# Patient Record
Sex: MALE | Race: WHITE | URBAN - METROPOLITAN AREA
[De-identification: names, ages, dates, MRNs, and addresses within clinical notes are randomized per-mention and may not be internally consistent; named-entity substitution may affect disease eponyms.]

---

## 2017-06-29 ENCOUNTER — OFFICE VISIT (OUTPATIENT)
Dept: OTOLARYNGOLOGY | Facility: CLINIC | Age: 42
End: 2017-06-29

## 2017-06-29 VITALS — BODY MASS INDEX: 31.72 KG/M2 | WEIGHT: 168 LBS | HEIGHT: 61 IN

## 2017-06-29 DIAGNOSIS — R49.0 DYSPHONIA: Primary | ICD-10-CM

## 2017-06-29 DIAGNOSIS — J38.4 PRE-NODULAR EDEMA OF THE VOCAL FOLDS: ICD-10-CM

## 2017-06-29 ASSESSMENT — PAIN SCALES - GENERAL: PAINLEVEL: NO PAIN (0)

## 2017-06-29 NOTE — LETTER
"6/29/2017      RE: Sagar Harman  [address]  [facility] [address]       Dear Colleague:    I had the pleasure of meeting Sagar Harman in consultation at the [facility] of the [facility] on a self-referred basis, for evaluation of dysphonia. The note from our visit follows.  I appreciate the opportunity to participate in the care of this pleasant patient.    Please feel free to contact me with any questions.    Sincerely yours,    Yamilex Jacobsen M.D., M.P.H.  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 Symptoms include increased effort to talk/sing, poor voice quality, voice tires easily, voice breaks, change in vocal pitch, change in vocal volume, change in range, shaky/unsteady voice and raspy voice.    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 Over the past week he has been experiencing vocal fatigue in a way that feels unusual. His speaking range is more affected; breaking into falsetto easily, with very little mixed range. By end of the show, his low/mid range is barely present. Middle-range projection is the most fatiguing. His speaking voice often sounds normal to him, but feels like it is resonating slightly differently than normally. He has adjusted what he is doing vocally, which seems to be helping; he is projecting slightly less than he had been. His singing voice has been less affected and he reports that he can sing \"over or through things.\" He recently completed a busy series of rehearsals and run throughs, which compounded the difficulty. He notes that the performance schedule is likely to have a slightly lower vocal load than the past 2-3 weeks.    He " has had some difficulties with vocal fatigue in the past, but has never needed to miss a show and has not previously seen an ENT or speech therapist.    Swallowing  No concerns.     Cough/Throat-clearing  No concerns.     Breathing  No concerns.     Throat discomfort  No concerns.     Reflux-type symptoms  He never experiences heartburn/indigestion. He is not taking reflux medications.      MEDICATIONS:     No current outpatient prescriptions on file.       ALLERGIES:  No Known Allergies    PAST MEDICAL HISTORY: No past medical history on file.     PAST SURGICAL HISTORY: No past surgical history on file.    HABITS/SOCIAL HISTORY:    Social History   Substance Use Topics     Smoking status: Former Smoker     Years: 4.00     Smokeless tobacco: Never Used      Comment: patient smoked socially in his 20's     Alcohol use Not on file       FAMILY HISTORY:  No family history on file.    REVIEW OF SYSTEMS:  The patient completed a comprehensive 11 point review of systems (below), which was reviewed. Positives are as noted below; pertinent findings are as noted in the HPI.     Patient Supplied Answers to Review of Systems   ENT ROS 6/29/2017   Ears, Nose, Throat Hoarseness       PHYSICAL EXAMINATION:  General: The patient was alert and conversant, and in no acute distress.    Eyes: PERRL, conjunctiva and lids normal, sclera nonicteric.  Nose: Anterior rhinoscopy: no gross abnormalities. no  bleeding; no  mucopurulence; septum grossly normal, mild mucoid drainage and/or crusting.  Oral cavity/oropharynx: No masses or lesions. Dentition in good condition. Floor of mouth and oral tongue soft to palpation. Tongue mobility and palate elevation intact and symmetric.  Ears: Normal auricles, external auditory canals bilaterally. Visualized portions of tympanic membranes normal bilaterally. Moderate cerumen bilaterally.  Neck: No palpable cervical lymphadenopathy. There was mild to moderate tenderness to palpation of the  thyrohyoid space, which was very narrow. No obvious thyroid abnormality. Landmarks palpable.  Resp: Breathing comfortably, no stridor or stertor.  Neuro: Symmetric facial function. Other cranial nerves as documented above.  Psych: Normal affect, pleasant and cooperative.  Voice/speech: Mild dysphonia characterized by breathiness, roughness, strain and glottal cohen. More pronounced in upper range.  Extremities: No cyanosis, clubbing, or edema of the upper extremities.    Intake scores  Total Score for Last Patient-Answered VHI Questionnaire  VHI Total Score 6/29/2017   VHI Total Score 9     Total Score for Last Patient-Answered EAT Questionnaire  EAT Total Score 6/29/2017   EAT Total Score 0     Total Score for Last Patient-Answered CSI Questionnaire  CSI Total Score 6/29/2017   CSI Total Score 0       PROCEDURE:   Flexible fiberoptic laryngoscopy and laryngovideostroboscopy  Indications: This procedure was warranted to evaluate the patient's laryngeal function. Risks, benefits, and alternatives were discussed.  Description: After written informed consent was obtained, a time-out was performed to confirm patient identity, procedure, and procedure site. Topical 3% lidocaine with 0.25% phenylephrine was applied to the nasal cavities. I performed the endoscopy and no complications were apparent. Continuous and stroboscopic light were utilized to assess routine phonation and variable frequency phonation.  Performed by: Yamilex Jacobsen MD MPH  SLP: Mabel De Jesus, PhD, CCC-SLP  Findings: Normal nasopharynx. Normal base of tongue, valleculae, and epiglottis. Vocal fold mobility: right: normal; left: normal. Medial edges of the vocal folds: smooth and straight with subtle prominence of the mid vocal folds bilaterally. No focal mucosal lesions were observed on the true vocal folds. Glissade produced appropriate elongation. There was moderate supraglottic recruitment with connected speech, dramatically improved on  singing. Mucosa of false vocal folds, aryepiglottic folds, piriform sinuses, and posterior glottis unremarkable. Airway was patent.   No lesions on NBI.    The addition of stroboscopy allowed evaluation of the mucosal wave.   Amplitude: right: normal; left: normal. Symmetry: good symmetry. Stroboscopy demonstrated the presence of soft translucent mild bilateral vocal fold swellings. Closure pattern: at modal pitch, simultaneously slightly spindle-shaped and hourglass-shaped. Closure plane: at glottic level. Phase distribution: normal.              IMPRESSION AND PLAN:   Sagar Harman presents with muscle tension dysphonia and subtle bilateral mid vocal fold swellings. He was pleased to see that there were no focal lesions that would require voice rest or surgical intervention. His laryngeal configuration during singing is more efficient than in speaking. I therefore recommended speech therapy as initial primary management, with goals including improving laryngeal efficiency and improving phonatory mechanics. He will return as needed. He will be back in [place] after the run of this show is complete, and if he needs assistance establishing voice care, we will be happy to help. I appreciate the opportunity to participate in the care of this very pleasant patient.             Yamilex Jacobsen MD

## 2017-06-29 NOTE — MR AVS SNAPSHOT
After Visit Summary   2017    Sagar Harman    MRN: [id number]           Patient Information     Date Of Birth          1975        Visit Information        Provider Department      2017 11:45 AM Mabel De Jesus, SLP [facility]        Today's Diagnoses     Dysphonia    -  1       Follow-ups after your visit        Who to contact     Please call your clinic at [phone] to:    Ask questions about your health    Make or cancel appointments    Discuss your medicines    Learn about your test results    Speak to your doctor   If you have compliments or concerns about an experience at your clinic, or if you wish to file a complaint, please contact [facility] Patient Relations at [phone] or email us at [email or web address]         Additional Information About Your Visit        MyChart Information     Thrill is an electronic gateway that provides easy, online access to your medical records. With Thrill, you can request a clinic appointment, read your test results, renew a prescription or communicate with your care team.     To sign up for TitanFilet visit the website at [email or web address]   You will be asked to enter the access code listed below, as well as some personal information. Please follow the directions to create your username and password.     Your access code is: [email or web address]  Expires: 2017 11:35 AM     Your access code will  in 90 days. If you need help or a new code, please contact your [facility] or call [phone] for assistance.        Care EveryWhere ID     This is your Care EveryWhere ID. This could be used by other organizations to access your [facility] medical records  [id number]         Blood Pressure from Last 3 Encounters:   No data found for BP    Weight from Last 3 Encounters:   17 76.2 kg (168 lb)              We Performed the Following     C BEHAVIORAL &  QUALITATIVE ANALYSIS VOICE AND RESONANCE        Primary Care Provider    Doctor Unknown, MD       No address on file        Equal Access to Services     [facility] : Hadii ovi Croft, catie klein, caitlin laughlin. So [facility] [phone].    ATENCIÓN: Si habla español, tiene a perez disposición servicios gratuitos de asistencia lingüística. Llame al [phone].    We comply with applicable federal civil rights laws and [place] laws. We do not discriminate on the basis of race, color, national origin, age, disability sex, sexual orientation or gender identity.            Thank you!     Thank you for choosing [facility]  for your care. Our goal is always to provide you with excellent care. Hearing back from our patients is one way we can continue to improve our services. Please take a few minutes to complete the written survey that you may receive in the mail after your visit with us. Thank you!             Your Updated Medication List - Protect others around you: Learn how to safely use, store and throw away your medicines at www.disposemymeds.org.      Notice  As of 6/29/2017 11:59 PM    You have not been prescribed any medications.

## 2017-06-29 NOTE — PROGRESS NOTES
"Dear Colleague:    I had the pleasure of meeting Sagar Harman in consultation at the [facility] of the [facility] on a self-referred basis, for evaluation of dysphonia. The note from our visit follows.  I appreciate the opportunity to participate in the care of this pleasant patient.    Please feel free to contact me with any questions.    Sincerely yours,    Yamilex Jacobsen M.D., M.P.H.  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 Symptoms include increased effort to talk/sing, poor voice quality, voice tires easily, voice breaks, change in vocal pitch, change in vocal volume, change in range, shaky/unsteady voice and raspy voice.    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 Over the past week he has been experiencing vocal fatigue in a way that feels unusual. His speaking range is more affected; breaking into falsetto easily, with very little mixed range. By end of the show, his low/mid range is barely present. Middle-range projection is the most fatiguing. His speaking voice often sounds normal to him, but feels like it is resonating slightly differently than normally. He has adjusted what he is doing vocally, which seems to be helping; he is projecting slightly less than he had been. His singing voice has been less affected and he reports that he can sing \"over or through things.\" He recently completed a busy series of rehearsals and run throughs, which compounded the difficulty. He notes that the performance schedule is likely to have a slightly lower vocal load than the past 2-3 weeks.    He has had some difficulties with vocal fatigue in the past, but has never " needed to miss a show and has not previously seen an ENT or speech therapist.    Swallowing  No concerns.     Cough/Throat-clearing  No concerns.     Breathing  No concerns.     Throat discomfort  No concerns.     Reflux-type symptoms  He never experiences heartburn/indigestion. He is not taking reflux medications.      MEDICATIONS:     No current outpatient prescriptions on file.       ALLERGIES:  No Known Allergies    PAST MEDICAL HISTORY: No past medical history on file.     PAST SURGICAL HISTORY: No past surgical history on file.    HABITS/SOCIAL HISTORY:    Social History   Substance Use Topics     Smoking status: Former Smoker     Years: 4.00     Smokeless tobacco: Never Used      Comment: patient smoked socially in his 20's     Alcohol use Not on file       FAMILY HISTORY:  No family history on file.    REVIEW OF SYSTEMS:  The patient completed a comprehensive 11 point review of systems (below), which was reviewed. Positives are as noted below; pertinent findings are as noted in the HPI.     Patient Supplied Answers to Review of Systems   ENT ROS 6/29/2017   Ears, Nose, Throat Hoarseness       PHYSICAL EXAMINATION:  General: The patient was alert and conversant, and in no acute distress.    Eyes: PERRL, conjunctiva and lids normal, sclera nonicteric.  Nose: Anterior rhinoscopy: no gross abnormalities. no  bleeding; no  mucopurulence; septum grossly normal, mild mucoid drainage and/or crusting.  Oral cavity/oropharynx: No masses or lesions. Dentition in good condition. Floor of mouth and oral tongue soft to palpation. Tongue mobility and palate elevation intact and symmetric.  Ears: Normal auricles, external auditory canals bilaterally. Visualized portions of tympanic membranes normal bilaterally. Moderate cerumen bilaterally.  Neck: No palpable cervical lymphadenopathy. There was mild to moderate tenderness to palpation of the thyrohyoid space, which was very narrow. No obvious thyroid abnormality.  Landmarks palpable.  Resp: Breathing comfortably, no stridor or stertor.  Neuro: Symmetric facial function. Other cranial nerves as documented above.  Psych: Normal affect, pleasant and cooperative.  Voice/speech: Mild dysphonia characterized by breathiness, roughness, strain and glottal cohen. More pronounced in upper range.  Extremities: No cyanosis, clubbing, or edema of the upper extremities.    Intake scores  Total Score for Last Patient-Answered VHI Questionnaire  VHI Total Score 6/29/2017   VHI Total Score 9     Total Score for Last Patient-Answered EAT Questionnaire  EAT Total Score 6/29/2017   EAT Total Score 0     Total Score for Last Patient-Answered CSI Questionnaire  CSI Total Score 6/29/2017   CSI Total Score 0       PROCEDURE:   Flexible fiberoptic laryngoscopy and laryngovideostroboscopy  Indications: This procedure was warranted to evaluate the patient's laryngeal function. Risks, benefits, and alternatives were discussed.  Description: After written informed consent was obtained, a time-out was performed to confirm patient identity, procedure, and procedure site. Topical 3% lidocaine with 0.25% phenylephrine was applied to the nasal cavities. I performed the endoscopy and no complications were apparent. Continuous and stroboscopic light were utilized to assess routine phonation and variable frequency phonation.  Performed by: Yamilex Jacobsen MD MPH  SLP: Mabel De Jesus, PhD, CCC-SLP  Findings: Normal nasopharynx. Normal base of tongue, valleculae, and epiglottis. Vocal fold mobility: right: normal; left: normal. Medial edges of the vocal folds: smooth and straight with subtle prominence of the mid vocal folds bilaterally. No focal mucosal lesions were observed on the true vocal folds. Glissade produced appropriate elongation. There was moderate supraglottic recruitment with connected speech, dramatically improved on singing. Mucosa of false vocal folds, aryepiglottic folds, piriform sinuses,  and posterior glottis unremarkable. Airway was patent.   No lesions on NBI.    The addition of stroboscopy allowed evaluation of the mucosal wave.   Amplitude: right: normal; left: normal. Symmetry: good symmetry. Stroboscopy demonstrated the presence of soft translucent mild bilateral vocal fold swellings. Closure pattern: at modal pitch, simultaneously slightly spindle-shaped and hourglass-shaped. Closure plane: at glottic level. Phase distribution: normal.              IMPRESSION AND PLAN:   Sagar Harman presents with muscle tension dysphonia and subtle bilateral mid vocal fold swellings. He was pleased to see that there were no focal lesions that would require voice rest or surgical intervention. His laryngeal configuration during singing is more efficient than in speaking. I therefore recommended speech therapy as initial primary management, with goals including improving laryngeal efficiency and improving phonatory mechanics. He will return as needed. He will be back in [place] after the run of this show is complete, and if he needs assistance establishing voice care, we will be happy to help. I appreciate the opportunity to participate in the care of this very pleasant patient.

## 2017-06-29 NOTE — MR AVS SNAPSHOT
"              After Visit Summary   2017    Sagar Harman    MRN: [id number]           Patient Information     Date Of Birth          1975        Visit Information        Provider Department      2017 11:45 AM Yamilex Jacobsen MD [facility] N[facility] Th[facility]        Today's Diagnoses     Dysphonia    -  1    Pre-nodular edema of the vocal folds           Follow-ups after your visit        Who to contact     Please call your clinic at [phone] to:    Ask questions about your health    Make or cancel appointments    Discuss your medicines    Learn about your test results    Speak to your doctor   If you have compliments or concerns about an experience at your clinic, or if you wish to file a complaint, please contact [facility] Patient Relations at [phone] or email us at [email or web address]         Additional Information About Your Visit        MyChart Information     eVoter is an electronic gateway that provides easy, online access to your medical records. With eVoter, you can request a clinic appointment, read your test results, renew a prescription or communicate with your care team.     To sign up for Newsyt visit the website at [email or web address]   You will be asked to enter the access code listed below, as well as some personal information. Please follow the directions to create your username and password.     Your access code is: [email or web address]  Expires: 2017 11:35 AM     Your access code will  in 90 days. If you need help or a new code, please contact your [facility] or call [phone] for assistance.        Care EveryWhere ID     This is your Care EveryWhere ID. This could be used by other organizations to access your [facility] medical records  [id number]        Your Vitals Were     Height BMI (Body Mass Index)                1.56 m (5' 1.42\") 31.31 kg/m2           Blood Pressure from " Last 3 Encounters:   No data found for BP    Weight from Last 3 Encounters:   06/29/17 76.2 kg (168 lb)              We Performed the Following     IMAGESTREAM RECORDING ORDER     LARYNGOSCOPY FLEX/RIGID W STROBOSCOPY        Primary Care Provider    Doctor Unknown, MD       No address on file        Equal Access to Services     [facility] : Hadii aad stephanie bretto Sofosterali, waaxda luqadaha, qaybta kaalmada adeegyada, waxshan checoin hayherbertn kandi ascencionfamilia vogel . So [facility] [phone].    ATENCIÓN: Si habla español, tiene a perez disposición servicios gratuitos de asistencia lingüística. Llame al [phone].    We comply with applicable federal civil rights laws and [place] laws. We do not discriminate on the basis of race, color, national origin, age, disability sex, sexual orientation or gender identity.            Thank you!     Thank you for choosing [facility]  for your care. Our goal is always to provide you with excellent care. Hearing back from our patients is one way we can continue to improve our services. Please take a few minutes to complete the written survey that you may receive in the mail after your visit with us. Thank you!             Your Updated Medication List - Protect others around you: Learn how to safely use, store and throw away your medicines at www.disposemymeds.org.      Notice  As of 6/29/2017  1:44 PM    You have not been prescribed any medications.

## 2017-06-29 NOTE — LETTER
"6/29/2017       RE: Sagar Harman  [address]  [facility] [address]     Dear Colleague,    Thank you for referring your patient, Sagar Harman, to the [facility] at [facility]. Please see a copy of my visit note below.    [facility]  Matthew Winston Jr., M.D., F.A.C.S.  Yamilex Jacobsen M.D., M.P.H.  Mabel De Jesus, Ph.D., CCC/SLP  Azra Lam M.M. (voice), M.SUZE., CCC/SLP  Jamie Luna M.M. (voice), M.A., CCC/SLP    [facility]  VOICE/SPEECH/BREATHING EVALUATION AND LARYNGEAL EXAMINATION REPORT    Patient: Sagar Harman  Date of Visit: 6/29/2017    HISTORY  Sagar Harman was seen for initial voice evaluation, laryngeal examination and treatment in conjunction with a visit to Dr. Jacobsen.  Please refer to Dr. Jacobsen s dictation for a more complete history and impressions. 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    Currently starring in a musical at [employer]    \"voice is fatigued in a way it has never been before\"    Speaking range and middle projected range are most problematic    Using microphone in the show, but director has asked for \"projected voice\" which has lead to vocal overload    In speech, voice in modal register suddenly breaks into falsetto; worsens quickly such that the low-mid range is \"barely there\"    Doesn't recover fully by the next day    Recently improving because he made adjustments to how he was producing voice in speaking; still concerned for laryngeal health and safety to perform     No history of voice problems despite very heavy vocal demand as gaona-actor    DIAGNOSIS/REASON FOR REFERRAL  Dysphonia/ Evaluate, perform laryngeal exam, treat as appropriate    [facility] Supp[facility] Answers To [facility]  [facility] Intake - General Form Review 6/29/2017   Are you having any of these symptoms? Increased effort to " talk/sing, Poor voice quality   Are you having any of these symptoms? Voice tires easily, Voice breaks, Change in vocal pitch, Change in vocal volume, Change in range, Shaky/unsteady voice, Raspy voice   Do you use caffeine? Yes   If you do use caffeine, how many oz. do you typically drink in a day? 2 cups   How many oz. of non-caffeinated fluid do you typically drink in a day? a lot   How often do you experience heartburn, indigestion, chest pain, stomach acid coming up, and/or tasting acid in your mouth or throat? Never   Have reflux medications been recommended to you? No   If yes, are you taking them regularly? N/A   Voice: Yes   Swallowing: No   Cough and/or throat-clearing: No   Breathing problem: No   Throat discomfort and/or the feeling of a lump in your throat: No   A different problem, not listed above: No   Other physicians/health care providers who should receive a copy of today's note (please provide first and last name(s), city): [place]       Patient Supplied Answers To [facility] Intake Voice Questionnaire  [facility] Intake - Voice Review 6/29/2017   How long have you had this voice problem? 1 week   In regards to your voice problem, was there anything unusual about the time the problem was first noticed (such as illness, accident, surgery, etc.)?  If yes, please describe.  You have 200 characters to respond.  We will ask for more detail at your visit. tons of singing speaking   What do you think caused this voice problem? over use   How quickly did the voice problem develop? Gradually   For your voice problem, how do the symptoms vary? Worse after heavy voice use   Over time, how has the voice problem changed? Same   How would you rate your typical vocal demand? Extraordinary: Very high voice demands; your work or personal success depends heavily on your voice quality   Please list activities that involve your voice (such as singing, coaching, etc.): singing projected speech   Effort for  "speaking 5   Effort for singing 5   Overall vocal quality 4   Is your voice ever normal, even briefly? Yes   What health care professionals have you seen for this voice problem?  Who and when? none   For your voice problem, what testing/studies have you done (such as imaging/reflux testing)? none   Did you receive any therapy or treatment for your voice problem?  Please describe briefly. none   Prior to this episode, have you ever had a voice problem before?  If yes, at that time did you have therapy or treatment for the voice problem?  Please provide a brief description of that treatment. nothing beyond fatigue   For your voice problem, is there anything else you'd like to tell us? no       Patient Supplied Answers To VHI Questionnaire  Voice Handicap Index (VHI-10) 6/29/2017   How often do you have any of the following symptoms:  Indigestion, heartburn, chest pain, stomach acid coming up, and/or tasting acid in your mouth or throat? Never   (F1) My voice makes it difficult for people to hear me. Almost never   (F2) People have difficulty understanding me in a noisy room. Sometimes   (F8) My voice difficulties restrict my personal and social life. Never   (F9) I feel left out of conversations because of my voice. Never   (F10) My voice problem causes me to lose income. Never   (P5) I feel as though I have to strain to produce voice. Almost never   (P6) The clarity of my voice is unpredictable. Sometimes   (E4) My voice problem upsets me. Sometimes   (E6) My voice makes me feel handicapped. Almost never   (P3) People ask, \"What's wrong with your voice?\" Never   VHI Total Score 9     Patient Supplied Answers To EAT Questionnaire  Eating Assessment Tool (EAT-10) 6/29/2017   My swallowing problem has caused me to lose weight. 0   My swallowing problem interferes with my ability to go out for meals. 0   Swallowing solids takes extra effort. 0   Swallowing pills takes extra effort. 0   Swallowing is painful. 0   The " "pleasure of eating is affected by my swallowing. 0   When I swallow food sticks in my throat. 0   I cough when I eat. 0   Swallowing is stressful. 0   How often do things go down the wrong way when you swallow? Never   Have you had pneumonia, bronchitis, or another severe lung infection in the last 6 months? No   EAT Total Score 0     Patient Supplied Answers To CSI Questionnaire  Cough Severity Index (CSI) 6/29/2017   My cough is worse when I lie down. Never   My coughing problem causes me to restrict my personal and social life. Never   I tend to avoid places because of my cough problem. Never   I feel embarrassed because of my coughing problem. Never   People ask, \"What's wrong?\" because I cough a lot. Never   I run out of air when I cough. Never   My coughing problem affects my voice. Never   My coughing problem limits my physical activity. Never   My coughing problem upsets me. Never   People ask me if I am sick because I cough a lot. Never   CSI Total Score 0         CURRENT PATIENT COMPLAINTS    Primary: voice    Secondary: none    Denies significant dyspnea, dysphagia, or pain    OTHER PERTINENT HISTORY    Unremarkable; Healthy, no medications    OBJECTIVE FINDINGS  VOICE/ SPEECH/ NON-COMMUNICATIVE LARYNGEAL BEHAVIORS EVALUATION  An evaluation of the voice was accomplished today; salient features are as follows:    Palpation of the laryngeal area shows:    Tenderness of the thyrohyoid area     Reduced thyrohyoid space     Additional closure of the thyrohyoid space on phonation    Breathing pattern:    Appears within normal limits and adequate; good technique    Tension is evident:    Neck - marked    Voice quality is characterized by    Excellent quality; resonant and clear    Mild to moderate backward focus and tongue-base tension when demonstrating lines from the show     Mild glottal cohen at ends of phrases; WNL    Habitual pitch was not formally tested, but is judged to be WNL and appropriate    Loudness " "is WNL and appropriate for the setting    Sustained vowels:     Comfortable pitch /a/: very mild roughness    High pitch /a/: less rough    A singing task shows voice quality is consistent between speech and singing, with overall excellent quality and excellent technique    In a pitch glide task to determine pitch range, he demonstrates a mild register break that he says is not unexpected recently; otherwise supranormal pitch range    he states today is a typical voice day for his voice recently; it is lower and \"chestier\" than his normal voice, although the technique is not different    GLOBAL ASSESSMENT OF DYSPHONIA: 48 /100    CAPE-V Overall Severity:   4/100    LARYNGEAL EXAMINATION    Endoscopic laryngeal examination was performed by:  Yamilex Jacobsen MD,   I provided technical support, and provided the protocol of instructions for the patient.  Type of exam:   Flexible endoscopy with chip-tip technology and stroboscopy  The laryngeal and pharyngeal structures were evaluated for gross appearance, mobility, function, and focal lesions / abnormalities of the associated mucosa.  Stroboscopy was warranted to evaluate closure, symmetry, and vibratory characteristics of the vocal folds.  All findings were within normal limits with the exception of the following salient features:     Mild collection of thickened secretions along the entire length of the vocal folds bilaterally    Very subtle swellings at the anterior one-thirds of the vibratory margins bilaterally    Slight spindle-shaped glottic gap apparent at midrange pitches    Mild anterior-posterior constriction during speech    Mild increased adduction at the posterior glottis    Excellent glottic and supraglottic technique in singing    Stroboscopy shows the swellings do not appear to interrupt the mucosal wave    Dr. Jacobsen and I reviewed this laryngeal exam with Mr. Harman today, and I provided pertinent explanations, as well as written " information:    Endoscopic findings are consistent with audio-perceptual assessment and patient Hx/complaints.    his symptoms are accounted for by the mild but contradictory findings of a slight spindle-shaped gap on phonatory tasks, despite the presence of mild swellings, with anterior-posterior constriction during speech that is reduced during singing    The etiology of the swellings is unclear, but these may actually be normal for him, or a natural reaction to very heavy vocal demand, and may resolve now that performances have begun (lighter vocal demand)    It seems likely that he has developed some muscle tension in his speech, due to the contradictory request from the director to use projected speech despite being miked, and then sing normally; this could result in a slightly backward focus and the spindle-shaped gap due to anterior-posterior sqeeze    The steps that he has taken to improve his speaking are already paying off, and he should continue with these    Because there appears to be a functional component to his symptoms, he may benefit from functional speech therapy if he does not have complete resolution within a week or two    For the time being, he will continue making the changes he already started    ASSESSMENT / PLAN  IMPRESSIONS: R49.0 (Dysphonia)     Laryngeal evaluation demonstrated:    slight swellings that may be normal for him, or a response to the recent very heavy vocal demand; in either case, they are most likely not the culprit for his vocal discomfort    Slight spindle-shaped glottic gap with anterior-posterior constriction that is likely due to unfortunate direction, and has already started resolving as he changed his vocal technique    With these minor changes, he is deemed safe to perform; there is no need for any medical intervention  RECOMMENDATIONS:     Medically based speech therapy is not recommended at this time, as Mr. Harman is already improving.  He is discharged at  this time. He is welcome to return for functional therapy if he does not have complete resolution in the next few weeks.     PRIMARY ICD-10 code:  R49.0 (Dysphonia)    TOTAL SERVICE TIME: 60 minutes  EVALUATION OF VOICE AND RESONANCE: (69335): 60 minutes    NO CHARGE FACILITY FEE (87865)    Mabel De Jesus, Ph.D., [facility]-SLP  Speech-Language Pathologist  Director, [facility]  [phone]              Again, thank you for allowing me to participate in the care of your patient.      Sincerely,    Mabel De Jesus, SLP

## 2017-06-29 NOTE — LETTER
Date:June 30, 2017      Patient was self referred, no letter generated. Do not send.        [facility] Health Information

## 2017-07-03 NOTE — PROGRESS NOTES
"[facility] VOICE CLINIC  Matthew Winston Jr., M.D., F.A.C.S.  Yamilex Jacobsen M.D., M.P.H.  Mabel De Jesus, Ph.D., CCC/SLP  Azra Lam M.M. (voice), MREJI., CCC/SLP  Jamie Luna M.M. (voice), MREJI., CCC/SLP    [facility] VOICE [facility]  VOICE/SPEECH/BREATHING EVALUATION AND LARYNGEAL EXAMINATION REPORT    Patient: Sagar Harman  Date of Visit: 6/29/2017    HISTORY  Sagar Harman was seen for initial voice evaluation, laryngeal examination and treatment in conjunction with a visit to Dr. Jacobsen.  Please refer to Dr. Jacobsen s dictation for a more complete history and impressions. 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    Currently starring in a musical at the [employer]    \"voice is fatigued in a way it has never been before\"    Speaking range and middle projected range are most problematic    Using microphone in the show, but director has asked for \"projected voice\" which has lead to vocal overload    In speech, voice in modal register suddenly breaks into falsetto; worsens quickly such that the low-mid range is \"barely there\"    Doesn't recover fully by the next day    Recently improving because he made adjustments to how he was producing voice in speaking; still concerned for laryngeal health and safety to perform     No history of voice problems despite very heavy vocal demand as gaona-actor    DIAGNOSIS/REASON FOR REFERRAL  Dysphonia/ Evaluate, perform laryngeal exam, treat as appropriate    Patient Supplied Answers To [facility] Intake General Questionnaire  [facility] Intake - General Form Review 6/29/2017   Are you having any of these symptoms? Increased effort to talk/sing, Poor voice quality   Are you having any of these symptoms? Voice tires easily, Voice breaks, Change in vocal pitch, Change in vocal volume, Change in range, Shaky/unsteady voice, Raspy voice   Do you use caffeine? Yes   If you do use caffeine, how many oz. do " you typically drink in a day? 2 cups   How many oz. of non-caffeinated fluid do you typically drink in a day? a lot   How often do you experience heartburn, indigestion, chest pain, stomach acid coming up, and/or tasting acid in your mouth or throat? Never   Have reflux medications been recommended to you? No   If yes, are you taking them regularly? N/A   Voice: Yes   Swallowing: No   Cough and/or throat-clearing: No   Breathing problem: No   Throat discomfort and/or the feeling of a lump in your throat: No   A different problem, not listed above: No   Other physicians/health care providers who should receive a copy of today's note (please provide first and last name(s), city): Divya Garcia       Patient Supplied Answers To [facility] Intake Voice Questionnaire  [facility] Intake - Voice Review 6/29/2017   How long have you had this voice problem? 1 week   In regards to your voice problem, was there anything unusual about the time the problem was first noticed (such as illness, accident, surgery, etc.)?  If yes, please describe.  You have 200 characters to respond.  We will ask for more detail at your visit. tons of singing speaking   What do you think caused this voice problem? over use   How quickly did the voice problem develop? Gradually   For your voice problem, how do the symptoms vary? Worse after heavy voice use   Over time, how has the voice problem changed? Same   How would you rate your typical vocal demand? Extraordinary: Very high voice demands; your work or personal success depends heavily on your voice quality   Please list activities that involve your voice (such as singing, coaching, etc.): singing projected speech   Effort for speaking 5   Effort for singing 5   Overall vocal quality 4   Is your voice ever normal, even briefly? Yes   What health care professionals have you seen for this voice problem?  Who and when? none   For your voice problem, what testing/studies have you done (such as  "imaging/reflux testing)? none   Did you receive any therapy or treatment for your voice problem?  Please describe briefly. none   Prior to this episode, have you ever had a voice problem before?  If yes, at that time did you have therapy or treatment for the voice problem?  Please provide a brief description of that treatment. nothing beyond fatigue   For your voice problem, is there anything else you'd like to tell us? no       Patient Supplied Answers To VHI Questionnaire  Voice Handicap Index (VHI-10) 6/29/2017   How often do you have any of the following symptoms:  Indigestion, heartburn, chest pain, stomach acid coming up, and/or tasting acid in your mouth or throat? Never   (F1) My voice makes it difficult for people to hear me. Almost never   (F2) People have difficulty understanding me in a noisy room. Sometimes   (F8) My voice difficulties restrict my personal and social life. Never   (F9) I feel left out of conversations because of my voice. Never   (F10) My voice problem causes me to lose income. Never   (P5) I feel as though I have to strain to produce voice. Almost never   (P6) The clarity of my voice is unpredictable. Sometimes   (E4) My voice problem upsets me. Sometimes   (E6) My voice makes me feel handicapped. Almost never   (P3) People ask, \"What's wrong with your voice?\" Never   VHI Total Score 9     Patient Supplied Answers To EAT Questionnaire  Eating Assessment Tool (EAT-10) 6/29/2017   My swallowing problem has caused me to lose weight. 0   My swallowing problem interferes with my ability to go out for meals. 0   Swallowing solids takes extra effort. 0   Swallowing pills takes extra effort. 0   Swallowing is painful. 0   The pleasure of eating is affected by my swallowing. 0   When I swallow food sticks in my throat. 0   I cough when I eat. 0   Swallowing is stressful. 0   How often do things go down the wrong way when you swallow? Never   Have you had pneumonia, bronchitis, or another severe " "lung infection in the last 6 months? No   EAT Total Score 0     Patient Supplied Answers To CSI Questionnaire  Cough Severity Index (CSI) 6/29/2017   My cough is worse when I lie down. Never   My coughing problem causes me to restrict my personal and social life. Never   I tend to avoid places because of my cough problem. Never   I feel embarrassed because of my coughing problem. Never   People ask, \"What's wrong?\" because I cough a lot. Never   I run out of air when I cough. Never   My coughing problem affects my voice. Never   My coughing problem limits my physical activity. Never   My coughing problem upsets me. Never   People ask me if I am sick because I cough a lot. Never   CSI Total Score 0         CURRENT PATIENT COMPLAINTS    Primary: voice    Secondary: none    Denies significant dyspnea, dysphagia, or pain    OTHER PERTINENT HISTORY    Unremarkable; Healthy, no medications    OBJECTIVE FINDINGS  VOICE/ SPEECH/ NON-COMMUNICATIVE LARYNGEAL BEHAVIORS EVALUATION  An evaluation of the voice was accomplished today; salient features are as follows:    Palpation of the laryngeal area shows:    Tenderness of the thyrohyoid area     Reduced thyrohyoid space     Additional closure of the thyrohyoid space on phonation    Breathing pattern:    Appears within normal limits and adequate; good technique    Tension is evident:    Neck - marked    Voice quality is characterized by    Excellent quality; resonant and clear    Mild to moderate backward focus and tongue-base tension when demonstrating lines from the show     Mild glottal cohen at ends of phrases; WNL    Habitual pitch was not formally tested, but is judged to be WNL and appropriate    Loudness is WNL and appropriate for the setting    Sustained vowels:     Comfortable pitch /a/: very mild roughness    High pitch /a/: less rough    A singing task shows voice quality is consistent between speech and singing, with overall excellent quality and excellent " "technique    In a pitch glide task to determine pitch range, he demonstrates a mild register break that he says is not unexpected recently; otherwise supranormal pitch range    he states today is a typical voice day for his voice recently; it is lower and \"chestier\" than his normal voice, although the technique is not different    GLOBAL ASSESSMENT OF DYSPHONIA: 48 /100    CAPE-V Overall Severity:   4/100    LARYNGEAL EXAMINATION    Endoscopic laryngeal examination was performed by:  Yamilex Jacobsen MD,   I provided technical support, and provided the protocol of instructions for the patient.  Type of exam:   Flexible endoscopy with chip-tip technology and stroboscopy  The laryngeal and pharyngeal structures were evaluated for gross appearance, mobility, function, and focal lesions / abnormalities of the associated mucosa.  Stroboscopy was warranted to evaluate closure, symmetry, and vibratory characteristics of the vocal folds.  All findings were within normal limits with the exception of the following salient features:     Mild collection of thickened secretions along the entire length of the vocal folds bilaterally    Very subtle swellings at the anterior one-thirds of the vibratory margins bilaterally    Slight spindle-shaped glottic gap apparent at midrange pitches    Mild anterior-posterior constriction during speech    Mild increased adduction at the posterior glottis    Excellent glottic and supraglottic technique in singing    Stroboscopy shows the swellings do not appear to interrupt the mucosal wave    Dr. Jacobsen and I reviewed this laryngeal exam with Mr. Harman today, and I provided pertinent explanations, as well as written information:    Endoscopic findings are consistent with audio-perceptual assessment and patient Hx/complaints.    his symptoms are accounted for by the mild but contradictory findings of a slight spindle-shaped gap on phonatory tasks, despite the presence of mild swellings, with " anterior-posterior constriction during speech that is reduced during singing    The etiology of the swellings is unclear, but these may actually be normal for him, or a natural reaction to very heavy vocal demand, and may resolve now that performances have begun (lighter vocal demand)    It seems likely that he has developed some muscle tension in his speech, due to the contradictory request from the director to use projected speech despite being miked, and then sing normally; this could result in a slightly backward focus and the spindle-shaped gap due to anterior-posterior sqeeze    The steps that he has taken to improve his speaking are already paying off, and he should continue with these    Because there appears to be a functional component to his symptoms, he may benefit from functional speech therapy if he does not have complete resolution within a week or two    For the time being, he will continue making the changes he already started    ASSESSMENT / PLAN  IMPRESSIONS: R49.0 (Dysphonia)     Laryngeal evaluation demonstrated:    slight swellings that may be normal for him, or a response to the recent very heavy vocal demand; in either case, they are most likely not the culprit for his vocal discomfort    Slight spindle-shaped glottic gap with anterior-posterior constriction that is likely due to unfortunate direction, and has already started resolving as he changed his vocal technique    With these minor changes, he is deemed safe to perform; there is no need for any medical intervention  RECOMMENDATIONS:     Medically based speech therapy is not recommended at this time, as Mr. Harman is already improving.  He is discharged at this time. He is welcome to return for functional therapy if he does not have complete resolution in the next few weeks.     PRIMARY ICD-10 code:  R49.0 (Dysphonia)    TOTAL SERVICE TIME: 60 minutes  EVALUATION OF VOICE AND RESONANCE: (27861): 60 minutes    NO CHARGE FACILITY FEE  (02595)    Mabel De Jesus, Ph.D., [facility]-SLP  Speech-Language Pathologist  Director, [facility]  [phone]